# Patient Record
Sex: FEMALE | Race: BLACK OR AFRICAN AMERICAN | NOT HISPANIC OR LATINO | Employment: UNEMPLOYED | URBAN - METROPOLITAN AREA
[De-identification: names, ages, dates, MRNs, and addresses within clinical notes are randomized per-mention and may not be internally consistent; named-entity substitution may affect disease eponyms.]

---

## 2024-08-08 ENCOUNTER — HOSPITAL ENCOUNTER (EMERGENCY)
Facility: CLINIC | Age: 34
Discharge: HOME | End: 2024-08-08
Attending: EMERGENCY MEDICINE | Admitting: EMERGENCY MEDICINE
Payer: COMMERCIAL

## 2024-08-08 VITALS
SYSTOLIC BLOOD PRESSURE: 124 MMHG | BODY MASS INDEX: 31.28 KG/M2 | OXYGEN SATURATION: 100 % | TEMPERATURE: 98.1 F | HEIGHT: 62 IN | RESPIRATION RATE: 16 BRPM | WEIGHT: 170 LBS | HEART RATE: 63 BPM | DIASTOLIC BLOOD PRESSURE: 83 MMHG

## 2024-08-08 DIAGNOSIS — R19.7 DIARRHEA, UNSPECIFIED TYPE: ICD-10-CM

## 2024-08-08 DIAGNOSIS — E86.0 DEHYDRATION: ICD-10-CM

## 2024-08-08 DIAGNOSIS — R11.2 NAUSEA AND VOMITING, UNSPECIFIED VOMITING TYPE: ICD-10-CM

## 2024-08-08 DIAGNOSIS — E87.6 HYPOKALEMIA: ICD-10-CM

## 2024-08-08 LAB
ALBUMIN SERPL BCG-MCNC: 4.2 G/DL (ref 3.5–5.2)
ALP SERPL-CCNC: 101 U/L (ref 40–150)
ALT SERPL W P-5'-P-CCNC: 13 U/L (ref 0–50)
ANION GAP SERPL CALCULATED.3IONS-SCNC: 13 MMOL/L (ref 7–15)
AST SERPL W P-5'-P-CCNC: 17 U/L (ref 0–45)
BASOPHILS # BLD AUTO: 0 10E3/UL (ref 0–0.2)
BASOPHILS NFR BLD AUTO: 0 %
BILIRUB SERPL-MCNC: 0.4 MG/DL
BUN SERPL-MCNC: 11.2 MG/DL (ref 6–20)
CALCIUM SERPL-MCNC: 9.2 MG/DL (ref 8.8–10.4)
CHLORIDE SERPL-SCNC: 102 MMOL/L (ref 98–107)
CREAT SERPL-MCNC: 0.6 MG/DL (ref 0.51–0.95)
EGFRCR SERPLBLD CKD-EPI 2021: >90 ML/MIN/1.73M2
EOSINOPHIL # BLD AUTO: 0 10E3/UL (ref 0–0.7)
EOSINOPHIL NFR BLD AUTO: 0 %
ERYTHROCYTE [DISTWIDTH] IN BLOOD BY AUTOMATED COUNT: 12.1 % (ref 10–15)
GLUCOSE SERPL-MCNC: 140 MG/DL (ref 70–99)
HCG SERPL QL: NEGATIVE
HCO3 SERPL-SCNC: 24 MMOL/L (ref 22–29)
HCT VFR BLD AUTO: 40.8 % (ref 35–47)
HGB BLD-MCNC: 13.9 G/DL (ref 11.7–15.7)
HOLD SPECIMEN: NORMAL
HOLD SPECIMEN: NORMAL
IMM GRANULOCYTES # BLD: 0 10E3/UL
IMM GRANULOCYTES NFR BLD: 0 %
LIPASE SERPL-CCNC: 21 U/L (ref 13–60)
LYMPHOCYTES # BLD AUTO: 1.2 10E3/UL (ref 0.8–5.3)
LYMPHOCYTES NFR BLD AUTO: 11 %
MCH RBC QN AUTO: 30.9 PG (ref 26.5–33)
MCHC RBC AUTO-ENTMCNC: 34.1 G/DL (ref 31.5–36.5)
MCV RBC AUTO: 91 FL (ref 78–100)
MONOCYTES # BLD AUTO: 0.4 10E3/UL (ref 0–1.3)
MONOCYTES NFR BLD AUTO: 3 %
NEUTROPHILS # BLD AUTO: 8.9 10E3/UL (ref 1.6–8.3)
NEUTROPHILS NFR BLD AUTO: 86 %
NRBC # BLD AUTO: 0 10E3/UL
NRBC BLD AUTO-RTO: 0 /100
PLATELET # BLD AUTO: 287 10E3/UL (ref 150–450)
POTASSIUM SERPL-SCNC: 3 MMOL/L (ref 3.4–5.3)
PROT SERPL-MCNC: 7.6 G/DL (ref 6.4–8.3)
RBC # BLD AUTO: 4.5 10E6/UL (ref 3.8–5.2)
SODIUM SERPL-SCNC: 139 MMOL/L (ref 135–145)
WBC # BLD AUTO: 10.6 10E3/UL (ref 4–11)

## 2024-08-08 PROCEDURE — 99284 EMERGENCY DEPT VISIT MOD MDM: CPT | Mod: 25 | Performed by: EMERGENCY MEDICINE

## 2024-08-08 PROCEDURE — 85025 COMPLETE CBC W/AUTO DIFF WBC: CPT | Performed by: EMERGENCY MEDICINE

## 2024-08-08 PROCEDURE — 83690 ASSAY OF LIPASE: CPT | Performed by: EMERGENCY MEDICINE

## 2024-08-08 PROCEDURE — 36415 COLL VENOUS BLD VENIPUNCTURE: CPT | Performed by: EMERGENCY MEDICINE

## 2024-08-08 PROCEDURE — 258N000003 HC RX IP 258 OP 636: Performed by: EMERGENCY MEDICINE

## 2024-08-08 PROCEDURE — 99284 EMERGENCY DEPT VISIT MOD MDM: CPT | Performed by: EMERGENCY MEDICINE

## 2024-08-08 PROCEDURE — 250N000011 HC RX IP 250 OP 636: Performed by: EMERGENCY MEDICINE

## 2024-08-08 PROCEDURE — 96361 HYDRATE IV INFUSION ADD-ON: CPT | Performed by: EMERGENCY MEDICINE

## 2024-08-08 PROCEDURE — 96375 TX/PRO/DX INJ NEW DRUG ADDON: CPT | Performed by: EMERGENCY MEDICINE

## 2024-08-08 PROCEDURE — 96374 THER/PROPH/DIAG INJ IV PUSH: CPT | Performed by: EMERGENCY MEDICINE

## 2024-08-08 PROCEDURE — 84703 CHORIONIC GONADOTROPIN ASSAY: CPT | Performed by: EMERGENCY MEDICINE

## 2024-08-08 PROCEDURE — 80053 COMPREHEN METABOLIC PANEL: CPT | Performed by: EMERGENCY MEDICINE

## 2024-08-08 RX ORDER — ONDANSETRON 8 MG/1
8 TABLET, ORALLY DISINTEGRATING ORAL EVERY 8 HOURS PRN
Qty: 10 TABLET | Refills: 0 | Status: SHIPPED | OUTPATIENT
Start: 2024-08-08 | End: 2024-08-11

## 2024-08-08 RX ORDER — ONDANSETRON 2 MG/ML
8 INJECTION INTRAMUSCULAR; INTRAVENOUS ONCE
Status: COMPLETED | OUTPATIENT
Start: 2024-08-08 | End: 2024-08-08

## 2024-08-08 RX ORDER — SODIUM CHLORIDE AND POTASSIUM CHLORIDE 150; 900 MG/100ML; MG/100ML
INJECTION, SOLUTION INTRAVENOUS CONTINUOUS
Status: DISCONTINUED | OUTPATIENT
Start: 2024-08-08 | End: 2024-08-08 | Stop reason: HOSPADM

## 2024-08-08 RX ADMIN — POTASSIUM CHLORIDE AND SODIUM CHLORIDE: 900; 150 INJECTION, SOLUTION INTRAVENOUS at 07:37

## 2024-08-08 RX ADMIN — SODIUM CHLORIDE 1000 ML: 9 INJECTION, SOLUTION INTRAVENOUS at 06:18

## 2024-08-08 RX ADMIN — FAMOTIDINE 20 MG: 10 INJECTION, SOLUTION INTRAVENOUS at 06:17

## 2024-08-08 RX ADMIN — ONDANSETRON 8 MG: 2 INJECTION INTRAMUSCULAR; INTRAVENOUS at 06:17

## 2024-08-08 ASSESSMENT — COLUMBIA-SUICIDE SEVERITY RATING SCALE - C-SSRS
6. HAVE YOU EVER DONE ANYTHING, STARTED TO DO ANYTHING, OR PREPARED TO DO ANYTHING TO END YOUR LIFE?: NO
1. IN THE PAST MONTH, HAVE YOU WISHED YOU WERE DEAD OR WISHED YOU COULD GO TO SLEEP AND NOT WAKE UP?: NO
2. HAVE YOU ACTUALLY HAD ANY THOUGHTS OF KILLING YOURSELF IN THE PAST MONTH?: NO

## 2024-08-08 ASSESSMENT — ACTIVITIES OF DAILY LIVING (ADL)
ADLS_ACUITY_SCORE: 35

## 2024-08-08 NOTE — DISCHARGE INSTRUCTIONS
Drink small amounts of fluids frequently. Use medication as prescribed. Return with any worsening or concerns. Follow up with your clinic doctor next week if not improved.

## 2024-08-08 NOTE — ED PROVIDER NOTES
"     Emergency Department Patient Sign-out       Brief HPI:  This is a 34 year old female signed out to me by Dr. Rivera .  See initial ED Provider note for details of the presentation.            Significant Events prior to my assuming care: 34-year-old female who presented on the earlier shift complaining of nausea vomiting diarrhea shortly after eating some fish for lunch the day prior.  Patient was felt to have a benign exam, with only epigastric tenderness.  Was treated symptomatically and labs obtained which were significant for hypokalemia.  Hypokalemia being replaced at the time of signout with a plan to reassess and discharge if the patient continues to improve.      Exam:   Patient Vitals for the past 24 hrs:   BP Temp Temp src Pulse Resp SpO2 Height Weight   08/08/24 0737 (!) 130/93 -- -- -- 20 100 % -- --   08/08/24 0539 (!) 152/100 97.9  F (36.6  C) Oral 89 20 99 % 1.575 m (5' 2\") 77.1 kg (170 lb)     EXAM:  HEENT: Normal.  Oropharynx clear and moist.  Neck: Supple, trachea midline, normal voice  Chest:  No respiratory distress, speaks in complete sentences, chest wall nontender, lungs clear in all fields  CV: Regular rate and rhythm, no murmur, normal pulse, no jugular venous distention  Abdomen: Nondistended, soft nontender.  No hepatosplenomegaly.  Extremities: No edema or tenderness          ED RESULTS:   Results for orders placed or performed during the hospital encounter of 08/08/24 (from the past 24 hour(s))   CBC with platelets differential     Status: Abnormal    Collection Time: 08/08/24  6:03 AM    Narrative    The following orders were created for panel order CBC with platelets differential.  Procedure                               Abnormality         Status                     ---------                               -----------         ------                     CBC with platelets and d...[[id number]]  Abnormal            Final result                 Please view results for these tests on " the individual orders.   Comprehensive metabolic panel     Status: Abnormal    Collection Time: 08/08/24  6:03 AM   Result Value Ref Range    Sodium 139 135 - 145 mmol/L    Potassium 3.0 (L) 3.4 - 5.3 mmol/L    Carbon Dioxide (CO2) 24 22 - 29 mmol/L    Anion Gap 13 7 - 15 mmol/L    Urea Nitrogen 11.2 6.0 - 20.0 mg/dL    Creatinine 0.60 0.51 - 0.95 mg/dL    GFR Estimate >90 >60 mL/min/1.73m2    Calcium 9.2 8.8 - 10.4 mg/dL    Chloride 102 98 - 107 mmol/L    Glucose 140 (H) 70 - 99 mg/dL    Alkaline Phosphatase 101 40 - 150 U/L    AST 17 0 - 45 U/L    ALT 13 0 - 50 U/L    Protein Total 7.6 6.4 - 8.3 g/dL    Albumin 4.2 3.5 - 5.2 g/dL    Bilirubin Total 0.4 <=1.2 mg/dL   Lipase     Status: Normal    Collection Time: 08/08/24  6:03 AM   Result Value Ref Range    Lipase 21 13 - 60 U/L   HCG qualitative pregnancy (blood)     Status: Normal    Collection Time: 08/08/24  6:03 AM   Result Value Ref Range    hCG Serum Qualitative Negative Negative   CBC with platelets and differential     Status: Abnormal    Collection Time: 08/08/24  6:03 AM   Result Value Ref Range    WBC Count 10.6 4.0 - 11.0 10e3/uL    RBC Count 4.50 3.80 - 5.20 10e6/uL    Hemoglobin 13.9 11.7 - 15.7 g/dL    Hematocrit 40.8 35.0 - 47.0 %    MCV 91 78 - 100 fL    MCH 30.9 26.5 - 33.0 pg    MCHC 34.1 31.5 - 36.5 g/dL    RDW 12.1 10.0 - 15.0 %    Platelet Count 287 150 - 450 10e3/uL    % Neutrophils 86 %    % Lymphocytes 11 %    % Monocytes 3 %    % Eosinophils 0 %    % Basophils 0 %    % Immature Granulocytes 0 %    NRBCs per 100 WBC 0 <1 /100    Absolute Neutrophils 8.9 (H) 1.6 - 8.3 10e3/uL    Absolute Lymphocytes 1.2 0.8 - 5.3 10e3/uL    Absolute Monocytes 0.4 0.0 - 1.3 10e3/uL    Absolute Eosinophils 0.0 0.0 - 0.7 10e3/uL    Absolute Basophils 0.0 0.0 - 0.2 10e3/uL    Absolute Immature Granulocytes 0.0 <=0.4 10e3/uL    Absolute NRBCs 0.0 10e3/uL       ED MEDICATIONS:   Medications   0.9% sodium chloride + KCl 20 mEq/L infusion ( Intravenous $New Bag  8/8/24 0737)   ondansetron (ZOFRAN) injection 8 mg (8 mg Intravenous $Given 8/8/24 0617)   sodium chloride 0.9% BOLUS 1,000 mL (0 mLs Intravenous Stopped 8/8/24 0737)   famotidine (PEPCID) injection 20 mg (20 mg Intravenous $Given 8/8/24 0617)         Impression:    ICD-10-CM    1. Nausea and vomiting, unspecified vomiting type  R11.2       2. Diarrhea, unspecified type  R19.7       3. Dehydration  E86.0       4. Hypokalemia  E87.6           Plan:    Pending studies include none.  Patient reports feeling substantially better, potassium has been fused, the patient has no significant abdominal pain or tenderness currently and appears appropriate for discharge as per the plan at signout.  We discussed the indications for emergency department return and follow-up.  Stable for discharge.  .        MD Kris Vital David, MD  08/08/24 4432

## 2024-08-08 NOTE — ED PROVIDER NOTES
"ED Provider Note  [facility]      History     Chief Complaint   Patient presents with    Abdominal Pain    Nausea, Vomiting, & Diarrhea     HPI  Arabella Ventura is a 34 year old female who presents with nausea, vomiting, diarrhea.  She reports that after eating some fish for lunch she vomited once.  She thought perhaps a fish was too oily.  However, later in the day she started vomiting again, has vomited approximately 10 times.  This is all been nonbloody.  She is also had a few episodes of nonbloody diarrhea.  She reports some discomfort, points to her epigastric area.  She states she has had C-sections, no other abdominal surgeries.  She denies any recent travel.  No recent antibiotic use.  No known ill contacts.  No fever.  No cough or shortness of breath.  No reported dysuria.  No other specific complaints.    Past Medical History  Past Medical History:   Diagnosis Date    Hypertension      History reviewed. No pertinent surgical history.  ondansetron (ZOFRAN ODT) 8 MG ODT tab      No Known Allergies  Family History  History reviewed. No pertinent family history.  Social History   Social History     Tobacco Use    Smoking status: Never     Passive exposure: Never    Smokeless tobacco: Never   Substance Use Topics    Alcohol use: Never    Drug use: Never      A medically appropriate review of systems was performed with pertinent positives and negatives noted in the HPI, and all other systems negative.    Physical Exam   BP: (!) 152/100  Pulse: 89  Temp: 97.9  F (36.6  C)  Resp: 20  Height: 157.5 cm (5' 2\")  Weight: 77.1 kg (170 lb) (pt reported, declined standing scale)  SpO2: 99 %  Physical Exam  Constitutional:       General: She is not in acute distress.     Appearance: Normal appearance. She is not toxic-appearing.   HENT:      Head: Atraumatic.      Mouth/Throat:      Mouth: Mucous membranes are dry.   Eyes:      General: No scleral icterus.     Conjunctiva/sclera: " Conjunctivae normal.   Cardiovascular:      Rate and Rhythm: Normal rate.      Heart sounds: Normal heart sounds.   Pulmonary:      Effort: No respiratory distress.      Breath sounds: Normal breath sounds.   Abdominal:      Palpations: Abdomen is soft.      Tenderness: There is abdominal tenderness (mild epigastric tenderness).   Musculoskeletal:         General: No deformity.      Cervical back: Neck supple.   Skin:     General: Skin is warm.      Findings: No rash.   Neurological:      Mental Status: She is alert.           ED Course, Procedures, & Data      Procedures            Results for orders placed or performed during the hospital encounter of 08/08/24   Comprehensive metabolic panel     Status: Abnormal   Result Value Ref Range    Sodium 139 135 - 145 mmol/L    Potassium 3.0 (L) 3.4 - 5.3 mmol/L    Carbon Dioxide (CO2) 24 22 - 29 mmol/L    Anion Gap 13 7 - 15 mmol/L    Urea Nitrogen 11.2 6.0 - 20.0 mg/dL    Creatinine 0.60 0.51 - 0.95 mg/dL    GFR Estimate >90 >60 mL/min/1.73m2    Calcium 9.2 8.8 - 10.4 mg/dL    Chloride 102 98 - 107 mmol/L    Glucose 140 (H) 70 - 99 mg/dL    Alkaline Phosphatase 101 40 - 150 U/L    AST 17 0 - 45 U/L    ALT 13 0 - 50 U/L    Protein Total 7.6 6.4 - 8.3 g/dL    Albumin 4.2 3.5 - 5.2 g/dL    Bilirubin Total 0.4 <=1.2 mg/dL   Lipase     Status: Normal   Result Value Ref Range    Lipase 21 13 - 60 U/L   HCG qualitative pregnancy (blood)     Status: Normal   Result Value Ref Range    hCG Serum Qualitative Negative Negative   CBC with platelets and differential     Status: Abnormal   Result Value Ref Range    WBC Count 10.6 4.0 - 11.0 10e3/uL    RBC Count 4.50 3.80 - 5.20 10e6/uL    Hemoglobin 13.9 11.7 - 15.7 g/dL    Hematocrit 40.8 35.0 - 47.0 %    MCV 91 78 - 100 fL    MCH 30.9 26.5 - 33.0 pg    MCHC 34.1 31.5 - 36.5 g/dL    RDW 12.1 10.0 - 15.0 %    Platelet Count 287 150 - 450 10e3/uL    % Neutrophils 86 %    % Lymphocytes 11 %    % Monocytes 3 %    % Eosinophils 0 %    %  Basophils 0 %    % Immature Granulocytes 0 %    NRBCs per 100 WBC 0 <1 /100    Absolute Neutrophils 8.9 (H) 1.6 - 8.3 10e3/uL    Absolute Lymphocytes 1.2 0.8 - 5.3 10e3/uL    Absolute Monocytes 0.4 0.0 - 1.3 10e3/uL    Absolute Eosinophils 0.0 0.0 - 0.7 10e3/uL    Absolute Basophils 0.0 0.0 - 0.2 10e3/uL    Absolute Immature Granulocytes 0.0 <=0.4 10e3/uL    Absolute NRBCs 0.0 10e3/uL   Extra Tube     Status: None    Narrative    The following orders were created for panel order Extra Tube.  Procedure                               Abnormality         Status                     ---------                               -----------         ------                     Extra Green Top (Lithium...[[id number]]                      Final result               Extra Purple Top Tube[[id number]]                            Final result                 Please view results for these tests on the individual orders.   Extra Green Top (Lithium Heparin) Tube     Status: None   Result Value Ref Range    Hold Specimen JIC    Extra Purple Top Tube     Status: None   Result Value Ref Range    Hold Specimen JIC    CBC with platelets differential     Status: Abnormal    Narrative    The following orders were created for panel order CBC with platelets differential.  Procedure                               Abnormality         Status                     ---------                               -----------         ------                     CBC with platelets and d...[[id number]]  Abnormal            Final result                 Please view results for these tests on the individual orders.     Medications   ondansetron (ZOFRAN) injection 8 mg (8 mg Intravenous $Given 8/8/24 0617)   sodium chloride 0.9% BOLUS 1,000 mL (0 mLs Intravenous Stopped 8/8/24 0737)   famotidine (PEPCID) injection 20 mg (20 mg Intravenous $Given 8/8/24 0617)     Labs Ordered and Resulted from Time of ED Arrival to Time of ED Departure   COMPREHENSIVE METABOLIC PANEL - Abnormal        Result Value    Sodium 139      Potassium 3.0 (*)     Carbon Dioxide (CO2) 24      Anion Gap 13      Urea Nitrogen 11.2      Creatinine 0.60      GFR Estimate >90      Calcium 9.2      Chloride 102      Glucose 140 (*)     Alkaline Phosphatase 101      AST 17      ALT 13      Protein Total 7.6      Albumin 4.2      Bilirubin Total 0.4     CBC WITH PLATELETS AND DIFFERENTIAL - Abnormal    WBC Count 10.6      RBC Count 4.50      Hemoglobin 13.9      Hematocrit 40.8      MCV 91      MCH 30.9      MCHC 34.1      RDW 12.1      Platelet Count 287      % Neutrophils 86      % Lymphocytes 11      % Monocytes 3      % Eosinophils 0      % Basophils 0      % Immature Granulocytes 0      NRBCs per 100 WBC 0      Absolute Neutrophils 8.9 (*)     Absolute Lymphocytes 1.2      Absolute Monocytes 0.4      Absolute Eosinophils 0.0      Absolute Basophils 0.0      Absolute Immature Granulocytes 0.0      Absolute NRBCs 0.0     LIPASE - Normal    Lipase 21     HCG QUALITATIVE PREGNANCY - Normal    hCG Serum Qualitative Negative       No orders to display          Critical care was not performed.     Medical Decision Making  The patient's presentation was of moderate complexity (an acute illness with systemic symptoms).    The patient's evaluation involved:  review of external note(s) from 1 sources (previuos note)  review of 3+ test result(s) ordered prior to this encounter (previous result)  ordering and/or review of 3+ test(s) in this encounter (see separate area of note for details)    The patient's management necessitated moderate risk (prescription drug management including medications given in the ED).    Assessment & Plan    Basic labs were checked, LFTs were normal, lipase unremarkable.  Potassium is low at 3.0.  She was given antiemetic, IV fluids.  On repeat evaluation she reports that her symptoms are essentially gone.  She was able to tolerate a p.o. challenge.  She was signed out at shift change signout pending  repletion of her potassium with plan to discharge with antiemetics, oral hydration at home, light diet, follow-up, return with any worsening or concerns.  The patient had verbalized understanding of these plans at time of signout.    Dictation Disclaimer: Some of this Note has been completed with voice-recognition dictation software. Although errors are generally corrected real-time, there is the potential for a rare error to be present in the completed chart.      I have reviewed the nursing notes. I have reviewed the findings, diagnosis, plan and need for follow up with the patient.    Discharge Medication List as of 8/8/2024  9:34 AM        START taking these medications    Details   ondansetron (ZOFRAN ODT) 8 MG ODT tab Take 1 tablet (8 mg) by mouth every 8 hours as needed for nausea, Disp-10 tablet, R-0, Local Print             Final diagnoses:   Nausea and vomiting, unspecified vomiting type   Diarrhea, unspecified type   Dehydration   Hypokalemia       Alison Rivera  [facility] EMERGENCY DEPARTMENT  8/8/2024     Alison Rivera MD  08/09/24 0758

## 2024-08-08 NOTE — ED TRIAGE NOTES
Patient c/o upper abdominal pain, nausea and vomiting, and diarrhea. She said it all started around 0100.      Triage Assessment (Adult)       Row Name 08/08/24 0541          Triage Assessment    Airway WDL WDL        Respiratory WDL    Respiratory WDL WDL        Skin Circulation/Temperature WDL    Skin Circulation/Temperature WDL WDL        Cardiac WDL    Cardiac WDL WDL        Peripheral/Neurovascular WDL    Peripheral Neurovascular WDL WDL        Cognitive/Neuro/Behavioral WDL    Cognitive/Neuro/Behavioral WDL WDL

## 2024-09-18 DIAGNOSIS — M25.552 BILATERAL HIP PAIN: Primary | ICD-10-CM

## 2024-09-18 DIAGNOSIS — M25.551 BILATERAL HIP PAIN: Primary | ICD-10-CM

## 2024-09-20 NOTE — TELEPHONE ENCOUNTER
DIAGNOSIS: b\l hip pain \self\ucare\ortho con    APPOINTMENT DATE: 9/25/24   NOTES STATUS DETAILS   OFFICE NOTE from referring provider Internal Self   MEDICATION LIST Internal    XRAYS  & INJECTIONS (IMAGES & REPORTS) Internal Scheduled for 9/25

## 2024-09-25 ENCOUNTER — OFFICE VISIT (OUTPATIENT)
Dept: ORTHOPEDICS | Facility: CLINIC | Age: 34
End: 2024-09-25
Payer: COMMERCIAL

## 2024-09-25 ENCOUNTER — PRE VISIT (OUTPATIENT)
Dept: ORTHOPEDICS | Facility: CLINIC | Age: 34
End: 2024-09-25

## 2024-09-25 ENCOUNTER — ANCILLARY PROCEDURE (OUTPATIENT)
Dept: GENERAL RADIOLOGY | Facility: CLINIC | Age: 34
End: 2024-09-25
Attending: FAMILY MEDICINE
Payer: COMMERCIAL

## 2024-09-25 DIAGNOSIS — M72.2 BILATERAL PLANTAR FASCIITIS: Primary | ICD-10-CM

## 2024-09-25 DIAGNOSIS — M25.551 BILATERAL HIP PAIN: ICD-10-CM

## 2024-09-25 DIAGNOSIS — M25.552 BILATERAL HIP PAIN: ICD-10-CM

## 2024-09-25 DIAGNOSIS — Q66.92: ICD-10-CM

## 2024-09-25 PROCEDURE — 99204 OFFICE O/P NEW MOD 45 MIN: CPT | Performed by: FAMILY MEDICINE

## 2024-09-25 PROCEDURE — 73630 X-RAY EXAM OF FOOT: CPT | Mod: LT | Performed by: RADIOLOGY

## 2024-09-25 NOTE — PROGRESS NOTES
HISTORY OF PRESENT ILLNESS  Ms. Ventura is a 34 year old female who presents to clinic today with bilateral foot pain.  Arabella reports pain for about 15 years. Pain is located medial bottom of the foot. Pain is worse when standing and hurts at night. She denies current treatment.  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      No current outpatient medications on file.       No Known Allergies    No family history on file.    Additional medical/Social/Surgical histories reviewed in [facility] and updated as appropriate.        PHYSICAL EXAM  General  - normal appearance, in no obvious distress  Musculoskeletal - left and right foot  - inspection: Shortened and elevated fourth toe in right foot, bilateral pes cavus  - palpation: tender at the medial calcaneal tubercle bilaterally  - ROM: normal active and passive ROM of great and lesser toes, no pain with MT translation  - strength: 5/5 in all planes  Neuro  - no sensory or motor deficit, grossly normal coordination, normal muscle tone               ASSESSMENT & PLAN  Ms. Ventura is a 34 year old female who presents to clinic today with bilateral foot pain.    I ordered and independently reviewed an xray of her feet.  Her right foot x-ray reveals a shortened fourth metatarsal.  Both feet display plantar calcaneal enthesophytes.    Her symptoms do seem to be secondary to plantar fasciitis.  It is difficult to tell whether or not her congenital foot issue in her right foot has played a role.    We did discuss the utility of orthotics, I do think that these will help her greatly given her high arches.    I am referring her to our podiatrists as well in case she may need treatment moving forward given her shortened toe.    It was a pleasure seeing Arabella today.    Jovanny Villa DO, [facility]  Primary Care Sports Medicine      This note was  constructed using Dragon dictation software, please excuse any minor errors in spelling, grammar, or syntax.

## 2024-09-25 NOTE — PATIENT INSTRUCTIONS
Orthotics and Prosthetics    [place]  [facility]  [address]   [address]  [place] [address]  Phone [phone]  Fax: [phone]    [facility]  [address]  [place] [address]  Phone [phone]  Fax: [phone]    [facility]  [address]  [address]  [place] [address]  Phone [phone]  Fax: [phone]    [facility]  [address] [place] [address]  Phone [phone]  Fax: [phone]    [facility]  [address]  [address] (located in the Wound Clinic)  [place] [address]  Phone [phone]  Fax: [phone]    [facility]  (Please use [facility])  [address] [place] [address]  Phone [phone]  Fax: [phone]    [facility]  [address]  [address]  [place] [address]  Phone [phone]  Fax: [phone]    [facility]   [address] [place] [place]  Phone [phone]  Fax: [phone]    [facility] [place]  [address]  [address]  [place] [address]  Phone [phone]  Fax: [phone]    [facility]  [address]  [address]  [place] [address]  Phone [phone]  Fax: [phone]    [facility]   [place]  [address] [place] [address]  Phone [phone]  Fax: [phone]

## 2024-09-25 NOTE — LETTER
9/25/2024      RE: Arabella Ventura  [address]  [place] [address]     Dear Colleague,    Thank you for referring your patient, Arabella Ventura, to the [facility] [place]. Please see a copy of my visit note below.      HISTORY OF PRESENT ILLNESS  Ms. Ventura is a 34 year old female who presents to clinic today with bilateral foot pain.  Arabella reports pain for about 15 years. Pain is located medial bottom of the foot. Pain is worse when standing and hurts at night. She denies current treatment.  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      No current outpatient medications on file.       No Known Allergies    No family history on file.    Additional medical/Social/Surgical histories reviewed in [facility] and updated as appropriate.        PHYSICAL EXAM  General  - normal appearance, in no obvious distress  Musculoskeletal - left and right foot  - inspection: Shortened and elevated fourth toe in right foot, bilateral pes cavus  - palpation: tender at the medial calcaneal tubercle bilaterally  - ROM: normal active and passive ROM of great and lesser toes, no pain with MT translation  - strength: 5/5 in all planes  Neuro  - no sensory or motor deficit, grossly normal coordination, normal muscle tone               ASSESSMENT & PLAN  Ms. Ventura is a 34 year old female who presents to clinic today with bilateral foot pain.    I ordered and independently reviewed an xray of her feet.  Her right foot x-ray reveals a shortened fourth metatarsal.  Both feet display plantar calcaneal enthesophytes.    Her symptoms do seem to be secondary to plantar fasciitis.  It is difficult to tell whether or not her congenital foot issue in her right foot has played a role.    We did discuss the utility of orthotics, I do think that these will help her  greatly given her high arches.    I am referring her to our podiatrists as well in case she may need treatment moving forward given her shortened toe.    It was a pleasure seeing Arabella today.    Jovanny Villa DO, [facility]  Primary Care Sports Medicine      This note was constructed using Dragon dictation software, please excuse any minor errors in spelling, grammar, or syntax.      Again, thank you for allowing me to participate in the care of your patient.      Sincerely,    Jovanny Villa DO

## 2024-09-26 ENCOUNTER — TELEPHONE (OUTPATIENT)
Dept: ORTHOPEDICS | Facility: CLINIC | Age: 34
End: 2024-09-26
Payer: COMMERCIAL

## 2024-09-26 NOTE — TELEPHONE ENCOUNTER
Patient confirmed scheduled appointment with  service:  Date: 11/5/24  Time: 1:00pm  Visit type: NEW FOOT/ANKLE  Provider:   Location: [facility]

## 2024-10-30 ENCOUNTER — APPOINTMENT (OUTPATIENT)
Dept: INTERPRETER SERVICES | Facility: CLINIC | Age: 34
End: 2024-10-30
Payer: COMMERCIAL